# Patient Record
Sex: FEMALE | Race: WHITE | Employment: OTHER | URBAN - METROPOLITAN AREA
[De-identification: names, ages, dates, MRNs, and addresses within clinical notes are randomized per-mention and may not be internally consistent; named-entity substitution may affect disease eponyms.]

---

## 2019-02-18 NOTE — PATIENT DISCUSSION
After Visit Summary   8/24/2017    Efrem Ramos    MRN: [id number]           Patient Information     Date Of Birth          1943        Visit Information        Provider Department      8/24/2017 1:10 PM Bella Alvarado APRN CNP [facility] [place]        Today's Diagnoses     Mixed hyperlipidemia    -  1    Coronary artery disease involving native coronary artery of native heart with angina pectoris (H)        PVD (peripheral vascular disease) (H)        Mitral valve problem        Atrial fibrillation and flutter (H)        Abnormal cardiovascular stress test           Follow-ups after your visit        Additional Services     Follow-Up with Cardiac Advanced Practice Provider                 Your next 10 appointments already scheduled     Sep 05, 2017 11:00 AM CDT   Cath 90 Minute with [email or web address]   [facility] ([facility])    [address]  [place] [address]   [phone]            Sep 19, 2017  1:30 PM CDT   Return Visit with Cherise [place] PA-C   [facility] [place] ([facility])    [address]  [place] [address]   [phone]            Sep 20, 2017  1:00 PM CDT   Return Visit with  Oncology Nurse   [facility] ([facility])    [facility]  [address]  [place] [address]   [phone]            Sep 27, 2017  1:00 PM CDT   Return Visit with Shae Aldana MD   [facility] ([facility])    [facility] [place]  [address]  [place] [address]   [phone]              Future tests that were ordered for you today     Open Future Orders        Priority Expected Expires Ordered    Basic metabolic panel Routine 9/7/2017 8/24/2018 8/24/2017    Follow-Up with Cardiac Advanced Practice Provider Routine 9/7/2017  - Start lubricating ointment qHS "8/24/2018 8/24/2017    Cardiac Cath: Coronary Angiography Adult Order Routine 8/31/2017 8/24/2018 8/24/2017            Who to contact     If you have questions or need follow up information about today's clinic visit or your schedule please contact [facility] [place] directly at [phone].  Normal or non-critical lab and imaging results will be communicated to you by MyChart, letter or phone within 4 business days after the clinic has received the results. If you do not hear from us within 7 days, please contact the clinic through [employer] or phone. If you have a critical or abnormal lab result, we will notify you by phone as soon as possible.  Submit refill requests through [employer] or call your pharmacy and they will forward the refill request to us. Please allow 3 business days for your refill to be completed.          Additional Information About Your Visit        [employer] Information     [employer] gives you secure access to your electronic health record. If you see a primary care provider, you can also send messages to your care team and make appointments. If you have questions, please call your primary care clinic.  If you do not have a primary care provider, please call [phone] and they will assist you.        Care EveryWhere ID     This is your Care EveryWhere ID. This could be used by other organizations to access your [place] medical records  [id number]        Your Vitals Were     Pulse Height Pulse Oximetry BMI (Body Mass Index)          69 1.74 m (5' 8.5\") 98% 26.23 kg/m2         Blood Pressure from Last 3 Encounters:   08/24/17 126/77   07/07/17 102/65   06/28/17 100/68    Weight from Last 3 Encounters:   08/24/17 79.4 kg (175 lb 1.6 oz)   07/07/17 79.8 kg (176 lb)   06/16/17 81.2 kg (179 lb)              We Performed the Following     Follow-Up with Cardiac Advanced Practice Provider        Primary Care Provider Office Phone # Fax #    Danny Paige MD " [phone]       [address]  [facility] [address]        Equal Access to Services     JAMEEL HARRIS : Hadii angle quevedo purvi Hodge, wajenda luqcris, klausta kaantonyda angelique, alfonzo mosleyyair cris. So [facility] [phone].    ATENCIÓN: Si habla español, tiene a palomo disposición servicios gratuitos de asistencia lingüística. Llame al [phone].    We comply with applicable federal civil rights laws and [place] laws. We do not discriminate on the basis of race, color, national origin, age, disability sex, sexual orientation or gender identity.            Thank you!     Thank you for choosing [facility] [place]  for your care. Our goal is always to provide you with excellent care. Hearing back from our patients is one way we can continue to improve our services. Please take a few minutes to complete the written survey that you may receive in the mail after your visit with us. Thank you!             Your Updated Medication List - Protect others around you: Learn how to safely use, store and throw away your medicines at www.disposemymeds.org.          This list is accurate as of: 8/24/17  1:47 PM.  Always use your most recent med list.                   Brand Name Dispense Instructions for use Diagnosis    acetaminophen 500 MG tablet    TYLENOL     Take 1,000 mg by mouth every 6 hours as needed for mild pain        apixaban ANTICOAGULANT 5 MG tablet    ELIQUIS    180 tablet    Take 1 tablet (5 mg) by mouth 2 times daily    Atrial fibrillation and flutter (H)       ASPIRIN PO      Take 81 mg by mouth every morning        atorvastatin 40 MG tablet    LIPITOR    30 tablet    Take 1 tablet (40 mg) by mouth daily    Coronary artery disease involving native coronary artery of native heart with angina pectoris (H)       B-12 2000 MCG Tabs      Take 1 tablet by mouth every morning        digoxin 125 MCG tablet    LANOXIN    30 tablet    Take 1 tablet  (125 mcg) by mouth daily    Atrial fibrillation and flutter (H)       gabapentin 300 MG capsule    NEURONTIN    60 capsule    Take 2 capsules (600 mg) by mouth every evening    Restless legs syndrome (RLS)       metoprolol 25 MG 24 hr tablet    TOPROL-XL    30 tablet    Take 0.5 tablets (12.5 mg) by mouth daily    Nonrheumatic aortic valve stenosis       multivitamin Tabs tablet      Take 1 tablet by mouth every morning

## 2019-06-17 NOTE — PATIENT DISCUSSION
Continue: Combigan (brimonidine-timolol): drops: 0.2-0.5% 1 drop twice a day as directed into right eye 03-

## 2019-07-11 NOTE — PATIENT DISCUSSION
- Follow up in 3 months for IOP check. Consider dilation if vision any worse.  Has borderline VS cataracts

## 2019-08-26 ENCOUNTER — NEW REFERRAL (OUTPATIENT)
Dept: URBAN - METROPOLITAN AREA CLINIC 26 | Facility: CLINIC | Age: 84
End: 2019-08-26

## 2019-08-26 VITALS — BODY MASS INDEX: 26.58 KG/M2 | HEIGHT: 63 IN | WEIGHT: 150 LBS

## 2019-08-26 DIAGNOSIS — H27.131: ICD-10-CM

## 2019-08-26 DIAGNOSIS — H35.3132: ICD-10-CM

## 2019-08-26 DIAGNOSIS — H43.813: ICD-10-CM

## 2019-08-26 DIAGNOSIS — H35.371: ICD-10-CM

## 2019-08-26 PROCEDURE — 92134 CPTRZ OPH DX IMG PST SGM RTA: CPT

## 2019-08-26 PROCEDURE — 92250 FUNDUS PHOTOGRAPHY W/I&R: CPT

## 2019-08-26 PROCEDURE — 99204 OFFICE O/P NEW MOD 45 MIN: CPT

## 2019-08-26 PROCEDURE — 92235 FLUORESCEIN ANGRPH MLTIFRAME: CPT

## 2019-08-26 ASSESSMENT — TONOMETRY
OD_IOP_MMHG: 18
OS_IOP_MMHG: 15

## 2019-08-26 ASSESSMENT — VISUAL ACUITY
OS_CC: 20/30
OD_SC: CF 2FT
OS_SC: 20/40

## 2019-09-09 ENCOUNTER — PRE-OP VISIT (OUTPATIENT)
Dept: URBAN - METROPOLITAN AREA CLINIC 33 | Facility: CLINIC | Age: 84
End: 2019-09-09

## 2019-09-09 DIAGNOSIS — H43.813: ICD-10-CM

## 2019-09-09 DIAGNOSIS — H35.371: ICD-10-CM

## 2019-09-09 DIAGNOSIS — H35.3132: ICD-10-CM

## 2019-09-09 DIAGNOSIS — T85.29XD: ICD-10-CM

## 2019-09-09 PROCEDURE — 92012 INTRM OPH EXAM EST PATIENT: CPT

## 2019-09-09 ASSESSMENT — VISUAL ACUITY: OS_CC: 20/25

## 2019-09-09 ASSESSMENT — TONOMETRY
OS_IOP_MMHG: 20
OD_IOP_MMHG: 20

## 2019-09-30 ENCOUNTER — SURGERY/PROCEDURE (OUTPATIENT)
Dept: URBAN - METROPOLITAN AREA SURGERY 12 | Facility: SURGERY | Age: 84
End: 2019-09-30

## 2019-09-30 DIAGNOSIS — T85.29XD: ICD-10-CM

## 2019-09-30 PROCEDURE — 67121 REMOVE EYE IMPLANT MATERIAL: CPT

## 2019-10-01 ENCOUNTER — POST OP-DILATION (OUTPATIENT)
Dept: URBAN - METROPOLITAN AREA CLINIC 33 | Facility: CLINIC | Age: 84
End: 2019-10-01

## 2019-10-01 DIAGNOSIS — T85.29XD: ICD-10-CM

## 2019-10-01 DIAGNOSIS — Z98.890: ICD-10-CM

## 2019-10-01 PROCEDURE — 99024 POSTOP FOLLOW-UP VISIT: CPT

## 2019-10-01 RX ORDER — TOBRAMYCIN 3 MG/ML: 1 SOLUTION/ DROPS OPHTHALMIC

## 2019-10-01 RX ORDER — PREDNISOLONE ACETATE 10 MG/ML: 1 SUSPENSION/ DROPS OPHTHALMIC

## 2019-10-01 ASSESSMENT — VISUAL ACUITY
OD_SC: CF 3FT
OS_SC: 20/30

## 2019-10-01 ASSESSMENT — TONOMETRY
OD_IOP_MMHG: 12
OS_IOP_MMHG: 18

## 2019-10-09 ENCOUNTER — POST OP-DILATION (OUTPATIENT)
Dept: URBAN - METROPOLITAN AREA CLINIC 33 | Facility: CLINIC | Age: 84
End: 2019-10-09

## 2019-10-09 DIAGNOSIS — T85.29XD: ICD-10-CM

## 2019-10-09 DIAGNOSIS — H27.01: ICD-10-CM

## 2019-10-09 DIAGNOSIS — Z98.890: ICD-10-CM

## 2019-10-09 PROCEDURE — 99024 POSTOP FOLLOW-UP VISIT: CPT

## 2019-10-09 ASSESSMENT — VISUAL ACUITY
OD_SC: CF 3FT
OS_SC: 20/30-1

## 2019-10-09 ASSESSMENT — TONOMETRY
OS_IOP_MMHG: 18
OD_IOP_MMHG: 24

## 2019-10-16 NOTE — PATIENT DISCUSSION
- Continue Latanoprost qHS (discussed why it is important to avoid missing doses, patient says he will do better)

## 2022-05-09 NOTE — PATIENT DISCUSSION
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 I recommended evaluation by an oculoplastic surgeon, but the patient declines surgery at this time. Care should be taken to protect the ocular surface with ophthalmic lubricants. The patient understands that there is a risk of infection or damage to the cornea due to this situation.

## 2022-06-04 ENCOUNTER — TELEPHONE ENCOUNTER (OUTPATIENT)
Dept: URBAN - METROPOLITAN AREA CLINIC 68 | Facility: CLINIC | Age: 87
End: 2022-06-04

## 2022-06-05 ENCOUNTER — TELEPHONE ENCOUNTER (OUTPATIENT)
Dept: URBAN - METROPOLITAN AREA CLINIC 68 | Facility: CLINIC | Age: 87
End: 2022-06-05

## 2022-06-25 ENCOUNTER — TELEPHONE ENCOUNTER (OUTPATIENT)
Age: 87
End: 2022-06-25

## 2022-06-26 ENCOUNTER — TELEPHONE ENCOUNTER (OUTPATIENT)
Age: 87
End: 2022-06-26

## 2022-09-08 NOTE — PATIENT DISCUSSION
Follow up as scheduled for IOP check in 1 month. If still in high teens consider starting IOP lowering therapy.